# Patient Record
Sex: FEMALE | Race: WHITE | NOT HISPANIC OR LATINO | URBAN - METROPOLITAN AREA
[De-identification: names, ages, dates, MRNs, and addresses within clinical notes are randomized per-mention and may not be internally consistent; named-entity substitution may affect disease eponyms.]

---

## 2019-12-06 ENCOUNTER — EMERGENCY (EMERGENCY)
Facility: HOSPITAL | Age: 49
LOS: 0 days | Discharge: HOME | End: 2019-12-06
Attending: EMERGENCY MEDICINE
Payer: COMMERCIAL

## 2019-12-06 VITALS
DIASTOLIC BLOOD PRESSURE: 69 MMHG | RESPIRATION RATE: 16 BRPM | HEART RATE: 69 BPM | TEMPERATURE: 98 F | SYSTOLIC BLOOD PRESSURE: 128 MMHG | OXYGEN SATURATION: 99 %

## 2019-12-06 VITALS
HEIGHT: 64 IN | HEART RATE: 95 BPM | OXYGEN SATURATION: 100 % | WEIGHT: 164.91 LBS | TEMPERATURE: 98 F | DIASTOLIC BLOOD PRESSURE: 83 MMHG | SYSTOLIC BLOOD PRESSURE: 122 MMHG | RESPIRATION RATE: 16 BRPM

## 2019-12-06 DIAGNOSIS — S60.512A ABRASION OF LEFT HAND, INITIAL ENCOUNTER: ICD-10-CM
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
## 2019-12-06 DIAGNOSIS — S60.511A ABRASION OF RIGHT HAND, INITIAL ENCOUNTER: ICD-10-CM

## 2019-12-06 DIAGNOSIS — S00.83XA CONTUSION OF OTHER PART OF HEAD, INITIAL ENCOUNTER: ICD-10-CM
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
## 2019-12-06 PROCEDURE — 99284 EMERGENCY DEPT VISIT MOD MDM: CPT | Mod: 25

## 2019-12-06 PROCEDURE — 99284 EMERGENCY DEPT VISIT MOD MDM: CPT

## 2019-12-06 PROCEDURE — 70450 CT HEAD/BRAIN W/O DYE: CPT | Mod: 26

## 2019-12-06 PROCEDURE — 90715 TDAP VACCINE 7 YRS/> IM: CPT

## 2019-12-06 PROCEDURE — 90471 IMMUNIZATION ADMIN: CPT

## 2019-12-06 PROCEDURE — 70450 CT HEAD/BRAIN W/O DYE: CPT

## 2019-12-06 RX ORDER — TETANUS TOXOID, REDUCED DIPHTHERIA TOXOID AND ACELLULAR PERTUSSIS VACCINE, ADSORBED 5; 2.5; 8; 8; 2.5 [IU]/.5ML; [IU]/.5ML; UG/.5ML; UG/.5ML; UG/.5ML
0.5 SUSPENSION INTRAMUSCULAR ONCE
Refills: 0 | Status: COMPLETED | OUTPATIENT
Start: 2019-12-06 | End: 2019-12-06

## 2019-12-06 RX ORDER — ACETAMINOPHEN 500 MG
1000 TABLET ORAL ONCE
Refills: 0 | Status: COMPLETED | OUTPATIENT
Start: 2019-12-06 | End: 2019-12-06

## 2019-12-06 RX ADMIN — Medication 1000 MILLIGRAM(S): at 08:36

## 2019-12-06 RX ADMIN — TETANUS TOXOID, REDUCED DIPHTHERIA TOXOID AND ACELLULAR PERTUSSIS VACCINE, ADSORBED 0.5 MILLILITER(S): 5; 2.5; 8; 8; 2.5 SUSPENSION INTRAMUSCULAR at 08:36
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
## 2019-12-06 NOTE — ED PROVIDER NOTE - PATIENT PORTAL LINK FT
You can access the FollowMyHealth Patient Portal offered by [facility] by registering at the following website: [email or web address]. By joining [facility]’s FollowMyHealth portal, you will also be able to view your health information using other applications (apps) compatible with our system.

## 2019-12-06 NOTE — ED PROVIDER NOTE - PROGRESS NOTE DETAILS
pt ambulated in the ed without ataxia no vision change and tolerated po  given concussion instructions  Return to the ER immediately for any worsening symptoms, concerns, chest pain, fevers, shortness of breath, vomiting, abdominal pain, rashes, neck pain, back pain, numbness, paresthesias, pain or any difficulties at all.  Please follow up with your own private physician or our medical clinic at [phone] in the next 2-3 days.  Find a doctor at [phone].  Copies of your tests were provided to you for follow-up.  You must address all your findings with your doctor.

## 2019-12-06 NOTE — ED PROVIDER NOTE - DISPOSITION TYPE
SUBJECTIVE:  CC/HPI:  Kemi Locke is a 49 year old female who presents at the request of dr Wyman for pre operative medical management. Kemi has ongoing shoulder pain that continues to limit her range of motion despite conservative treatment. She has opted to undergo Left shoulder arthroscopy with partial rotator cuff tear with repair and correction    Past Medical History:   Diagnosis Date   • Anxiety    • Arthritis    • CVA (cerebral vascular accident) (CMS/HCC)     patient states CVA at age 29,no residual effects   • Depression    • Gastroesophageal reflux disease     while pregnant   • High blood pressure    • Iron deficiency anemia due to chronic blood loss    • Iron deficiency anemia due to chronic blood loss    • Malignant neoplasm (CMS/HCC) may 2016    right breast, coming in for surgery    • Uncomplicated asthma 9/6/2017       Past Surgical History:   Procedure Laterality Date   • Breast surgery  5/2016 & 6/2016    lumpectomy,lymph nodes Dr. Brandon   • Past surgical history  negative    PSH of   • Sling oper stres incontinence  05/11/2018    stress urinary incontinence   • Total vaginal hysterectomy  08/09/2017    Bilateral Salpingo-oophorectomy       ALLERGIES:   Allergen Reactions   • Bee Sting ANAPHYLAXIS     Couldn't breathe and swollen throat   • Shellfish Allergy   (Food Or Med) Other (See Comments)   • Cantaloupe   (Food) RASH       Current Outpatient Medications   Medication Sig Dispense Refill   • meclizine (ANTIVERT) 25 MG tablet Take 1 tablet by mouth 3 times daily as needed for Dizziness. 30 tablet 1   • triamcinolone (ARISTOCORT) 0.1 % cream Apply topically 2 times daily. 45 g 1   • fluticasone-salmeterol (ADVAIR DISKUS) 500-50 MCG/DOSE inhaler Inhale 1 puff into the lungs two times daily. Rinse mouth and throat after each use 60 each 6   • Cholecalciferol (VITAMIN D3) 2000 units capsule Take 4,000 Units by mouth daily. 100 capsule 3   • losartan-hydrochlorothiazide (HYZAAR) 100-25 MG per tablet  Take 1 tablet by mouth daily. 90 tablet 1   • amLODIPine (NORVASC) 10 MG tablet Take 1 tablet by mouth daily. 90 tablet 1   • buPROPion (WELLBUTRIN XL) 300 MG 24 hr tablet Take 1 tablet by mouth daily. 30 tablet 4   • ARIPiprazole (ABILIFY) 15 MG tablet Take 1 tablet by mouth daily. 30 tablet 4   • TEMAZepam (RESTORIL) 15 MG capsule Take 1 capsule by mouth nightly as needed for Sleep. 30 capsule 5   • ALPRAZolam (XANAX) 0.5 MG tablet Take 1 tablet by mouth 3 times daily as needed for Anxiety. 90 tablet 5   • lamoTRIgine (LAMICTAL) 100 MG tablet Start taking 100 mg daily on 5/25 and continue taking 100 mg lamictal daily 30 tablet 5   • metoPROLOL tartrate (LOPRESSOR) 50 MG tablet TAKE 1 TABLET BY MOUTH TWICE DAILY 180 tablet 1   • ibuprofen (MOTRIN) 600 MG tablet Take 1 tablet by mouth every 6 hours as needed for Pain. 30 tablet 0   • anastrozole (ARIMIDEX) 1 MG tablet Take 1 tablet by mouth daily. 30 tablet 3   • DISPENSE Knee Brace 1 each 0   • DISPENSE Right upper extremity compression sleeve 20-30 mmHg pressure or 15-20mmHg pressure with glove to be worn daily.  Remove prior to bedtime. 1 each 0   • DISPENSE Knee Brace 1 each 0   • EPINEPHrine 0.3 MG/0.3ML auto-injector Inject 0.3 mLs into the muscle 1 time as needed for Anaphylaxis. 2 each 1     No current facility-administered medications for this visit.        Family History   Problem Relation Age of Onset   • Cancer Paternal Grandmother         breast cancer   • Hypertension Sister    • Hypertension Mother    • Thyroid Mother    • Diabetes Mother    • Cancer Father         lung, Colon    • Heart Father    • Cancer, Colon Father    • Cancer Maternal Aunt         x2, breast cancer   • Cancer Paternal Aunt         x3, breast cancer   • Cancer Other         paternal first cousins, x2, breast cancer   • Cancer Other         paternal great grandmother, breast cancer       Social History     Tobacco Use   • Smoking status: Current Every Day Smoker     Packs/day:  1.00     Years: 30.00     Pack years: 30.00     Types: Cigarettes   • Smokeless tobacco: Never Used   • Tobacco comment: States cutting back but amount admitted to has not changed in 2 years   Substance Use Topics   • Alcohol use: No     Alcohol/week: 0.0 standard drinks     Frequency: Never     Drinks per session: 1 or 2     Binge frequency: Never   • Drug use: No       REVIEW OF SYSTEMS:   GENERAL:  Patient denies fever, chills, tiredness, malaise, weight loss, or weight gain.  EYES:  Patient denies blurred vision, double vision, or pain.   NEUROLOGIC:  Patient denies tremors, dizzy spells, numbness, or tingling.  ENDOCRINE:  Patient denies excessive thirst, heat intolerance, or tired/sluggishness.  GASTROINTESTINAL:  Patient denies abdominal pain, nausea/vomiting, indigestion/heartburn, or constipation.  CARDIOVASCULAR:  Patient denies chest pain, shortness of breath, or palpitations.  SKIN:  Patient denies skin rashes, boils, or persistent itching.  MUSCULOSKELETAL:  Patient denies joint pain, neck pain, or back pain.  ENT/MOUTH:  Patient denies ear infections, sore throats, or sinus problems.  :  Patient denies urine retention, painful urination, urinary frequency, or blood in urine.  RESPIRATORY:  Patient denies wheezing, frequent cough, or shortness of breath.    PHYSICAL EXAMINATION:   Vitals:    Visit Vitals  /88   Pulse 88   Ht 5' 4\" (1.626 m)   Wt 75 kg   LMP 08/09/2017   SpO2 98%   BMI 28.38 kg/m²     General:  Awake, alert and oriented and in no acute distress  HEENT:  Normocephalic, atraumatic. Pupils equal, round and reactive to light and accommodation. Tympanic membranes clear, nasal mucosa normal and pharynx normal  Neck:  No carotid bruits, no thyromegaly, no JVD and no carotid bruits  Lungs:  Clear to auscultation, good air entry, no rales or wheezes and no rhonchi  Cardiovascular:  No JVD, S1 and S2 normal, no S3 or S4. No clicks, rubs or murmurs. PMI nondisplaced, no abdomen bruit and  pulses equal to dorsalis pedis  Abdomen:  Soft, nontender, nondistended, no hepatosplenomegaly, normal active bowel sounds and no masses palpated  Extremities:  No erythema or induration, no evidence of joint effusion, ROM of all joints is normal  Neuro:  Awake, alert, oriented x3, gait normal, balance normal, coordination normal, cranial nerves intact and strength normal throughout  Skin:  Color, texture, turgor are normal. No bruises, rashes or lesions       EKG-NSR    ASSESSMENT: Pre-op evaluation  (primary encounter diagnosis)  Comment: Pt is medically cleared for the above procedure if you have any questions please dont hesitate to contact me    Plan: CBC WITH DIFFERENTIAL, BASIC METABOPANEL,         ELECTROCARDIOGRAM 12-LEAD, ELECTROCARDIOGRAM         12-LEAD, XR CHEST PA AND LATERAL, URINALYSIS         WITH MICRO & CULTURE IF INDICATED    HTN-controlled    Depression-stable      DISCHARGE

## 2019-12-06 NOTE — ED PROVIDER NOTE - NS_ ATTENDINGSCRIBEDETAILS _ED_A_ED_FT
The scribe's documentation has been prepared under my direction and personally reviewed by me in its entirety. I confirm that the note above accurately reflects all work, treatment, procedures, and medical decision making performed by me (Dr. Montes).

## 2019-12-06 NOTE — ED PROVIDER NOTE - SKIN, MLM
Skin normal color for race, warm, dry. No evidence of rash. +forehead hematoma with no active bleeding. +left hand contusion. Abrasion left dorsal hand. Abrasions to 2nd and 3rd digits of right hand.

## 2021-09-15 NOTE — ED ADULT TRIAGE NOTE - TEMPERATURE IN FAHRENHEIT (DEGREES F)
Assessment/Plan:     Diagnoses and all orders for this visit:    1. Attention deficit hyperactivity disorder (ADHD), unspecified ADHD type  -     methylphenidate HCl (RITALIN) 5 mg tablet; Take 1 Tablet by mouth daily as needed (ADD) for up to 30 days. -     methylphenidate HCl 10 mg BP50; Take 1 Capsule by mouth daily. Max Daily Amount: 10 mg. Add short acting in afternoon for extended days. Otherwise 3 months refilled today. Her contract is pending for scan. Will request her records again today. 2. JOSELIN (generalized anxiety disorder)  -     escitalopram oxalate (LEXAPRO) 20 mg tablet; Take 1 Tablet by mouth nightly. Uncontrolled. Increase as above. The patient has consented for synchronous (real-time) Telemedicine (audio-video technology) on 9/15/21 for their care to be delivered over telemedicine in place of their regularly scheduled office visit pursuant to the emergency declaration under the [address] [place] [address], [address] [place] [address] and the [employer] and [employer] Act, this Virtual  Visit was conducted by the practitioner who is located in the medical office in [place], [place], with patient's consent, to reduce the patient's risk of exposure to COVID-19 and provide continuity of care. Visit Length: 20-29 minutes. Follow-up and Dispositions    · Return in about 3 months (around 12/15/2021) for Follow Up. Discussed expected course/resolution/complications of diagnosis in detail with patient. Medication risks/benefits/costs/interactions/alternatives discussed with patient. Pt was given after visit summary which includes diagnoses, current medications & vitals. Pt expressed understanding with the diagnosis and plan          Subjective:      Destiny Rock is a 26 y.o. female who presents for had concerns including Medication Refill. ADHD   Destiny Rock is compliant with treatment regimen. Patient denies chest pain, shortness of breath, weight loss, insomnia, or difficulty with appetite. Symptoms are currently well controlled. Patient denies personal or family history of drug abuse. Previous neuropsychiatric evaluation is not on file. Controlled substance contract is not on file. Was previously scanned however still pending for scan. Has long days at school where effect will run out several times a week. Reports anxiety has worsened. She is followed by Dr. June Rodney, psychology, where she had her neuropsychiatric evaluation. She is still followed for counseling. There is no problem list on file for this patient. Current Outpatient Medications   Medication Sig Dispense Refill    [START ON 11/21/2021] methylphenidate HCl 10 mg BP50 Take 1 Capsule by mouth daily. Max Daily Amount: 10 mg. 30 Capsule 0    methylphenidate HCl (RITALIN) 5 mg tablet Take 1 Tablet by mouth daily as needed (ADD) for up to 30 days. 30 Tablet 0    escitalopram oxalate (LEXAPRO) 20 mg tablet Take 1 Tablet by mouth nightly. 30 Tablet 1    melatonin 3 mg tablet Take 3 mg by mouth nightly. No Known Allergies    ROS:   Review of Systems   Constitutional: Negative for malaise/fatigue. Eyes: Negative for blurred vision. Respiratory: Negative for shortness of breath. Cardiovascular: Negative for chest pain. Objective: There were no vitals taken for this visit. Vitals and Nurse Documentation reviewed. Physical Exam  Constitutional:       Appearance: Normal appearance. Neurological:      Mental Status: She is alert.    Psychiatric:         Attention and Perception: Attention normal.         Mood and Affect: Mood normal.         Speech: Speech normal.         Behavior: Behavior normal.         Cognition and Memory: Cognition normal. 97.7
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
